# Patient Record
Sex: MALE | Race: BLACK OR AFRICAN AMERICAN | Employment: OTHER | URBAN - METROPOLITAN AREA
[De-identification: names, ages, dates, MRNs, and addresses within clinical notes are randomized per-mention and may not be internally consistent; named-entity substitution may affect disease eponyms.]

---

## 2018-03-08 ENCOUNTER — HOSPITAL ENCOUNTER (EMERGENCY)
Age: 23
Discharge: ANOTHER HOSPITAL | End: 2018-03-08
Attending: EMERGENCY MEDICINE
Payer: OTHER GOVERNMENT

## 2018-03-08 VITALS
HEART RATE: 60 BPM | WEIGHT: 151 LBS | DIASTOLIC BLOOD PRESSURE: 63 MMHG | TEMPERATURE: 98.4 F | RESPIRATION RATE: 16 BRPM | OXYGEN SATURATION: 99 % | BODY MASS INDEX: 22.36 KG/M2 | HEIGHT: 69 IN | SYSTOLIC BLOOD PRESSURE: 131 MMHG

## 2018-03-08 DIAGNOSIS — R45.851 SUICIDAL THOUGHTS: Primary | ICD-10-CM

## 2018-03-08 LAB
ALBUMIN SERPL-MCNC: 4 G/DL (ref 3.4–5)
ALBUMIN/GLOB SERPL: 1.1 {RATIO} (ref 0.8–1.7)
ALP SERPL-CCNC: 81 U/L (ref 45–117)
ALT SERPL-CCNC: 27 U/L (ref 16–61)
AMPHET UR QL SCN: NEGATIVE
ANION GAP SERPL CALC-SCNC: 8 MMOL/L (ref 3–18)
APAP SERPL-MCNC: <2 UG/ML (ref 10–30)
AST SERPL-CCNC: 19 U/L (ref 15–37)
BARBITURATES UR QL SCN: NEGATIVE
BASOPHILS # BLD: 0 K/UL (ref 0–0.06)
BASOPHILS NFR BLD: 0 % (ref 0–2)
BENZODIAZ UR QL: NEGATIVE
BILIRUB SERPL-MCNC: 0.4 MG/DL (ref 0.2–1)
BUN SERPL-MCNC: 12 MG/DL (ref 7–18)
BUN/CREAT SERPL: 10 (ref 12–20)
CALCIUM SERPL-MCNC: 8.8 MG/DL (ref 8.5–10.1)
CANNABINOIDS UR QL SCN: NEGATIVE
CHLORIDE SERPL-SCNC: 105 MMOL/L (ref 100–108)
CO2 SERPL-SCNC: 28 MMOL/L (ref 21–32)
COCAINE UR QL SCN: NEGATIVE
CREAT SERPL-MCNC: 1.18 MG/DL (ref 0.6–1.3)
DIFFERENTIAL METHOD BLD: NORMAL
EOSINOPHIL # BLD: 0 K/UL (ref 0–0.4)
EOSINOPHIL NFR BLD: 1 % (ref 0–5)
ERYTHROCYTE [DISTWIDTH] IN BLOOD BY AUTOMATED COUNT: 13.6 % (ref 11.6–14.5)
ETHANOL SERPL-MCNC: <3 MG/DL (ref 0–3)
GLOBULIN SER CALC-MCNC: 3.5 G/DL (ref 2–4)
GLUCOSE SERPL-MCNC: 103 MG/DL (ref 74–99)
HCT VFR BLD AUTO: 44.8 % (ref 36–48)
HDSCOM,HDSCOM: NORMAL
HGB BLD-MCNC: 14.8 G/DL (ref 13–16)
LYMPHOCYTES # BLD: 2.2 K/UL (ref 0.9–3.6)
LYMPHOCYTES NFR BLD: 39 % (ref 21–52)
MCH RBC QN AUTO: 30.3 PG (ref 24–34)
MCHC RBC AUTO-ENTMCNC: 33 G/DL (ref 31–37)
MCV RBC AUTO: 91.6 FL (ref 74–97)
METHADONE UR QL: NEGATIVE
MONOCYTES # BLD: 0.4 K/UL (ref 0.05–1.2)
MONOCYTES NFR BLD: 6 % (ref 3–10)
NEUTS SEG # BLD: 3 K/UL (ref 1.8–8)
NEUTS SEG NFR BLD: 54 % (ref 40–73)
OPIATES UR QL: NEGATIVE
PCP UR QL: NEGATIVE
PLATELET # BLD AUTO: 201 K/UL (ref 135–420)
PMV BLD AUTO: 9.9 FL (ref 9.2–11.8)
POTASSIUM SERPL-SCNC: 3.9 MMOL/L (ref 3.5–5.5)
PROT SERPL-MCNC: 7.5 G/DL (ref 6.4–8.2)
RBC # BLD AUTO: 4.89 M/UL (ref 4.7–5.5)
SALICYLATES SERPL-MCNC: <2.8 MG/DL (ref 2.8–20)
SODIUM SERPL-SCNC: 141 MMOL/L (ref 136–145)
WBC # BLD AUTO: 5.6 K/UL (ref 4.6–13.2)

## 2018-03-08 PROCEDURE — 80307 DRUG TEST PRSMV CHEM ANLYZR: CPT | Performed by: EMERGENCY MEDICINE

## 2018-03-08 PROCEDURE — 85025 COMPLETE CBC W/AUTO DIFF WBC: CPT | Performed by: EMERGENCY MEDICINE

## 2018-03-08 PROCEDURE — 99283 EMERGENCY DEPT VISIT LOW MDM: CPT

## 2018-03-08 PROCEDURE — 93005 ELECTROCARDIOGRAM TRACING: CPT

## 2018-03-08 PROCEDURE — 80053 COMPREHEN METABOLIC PANEL: CPT | Performed by: EMERGENCY MEDICINE

## 2018-03-08 NOTE — ED NOTES
Pt notified of transfer to [facility].     Telephone report called to Rosana Romero RN at [facility]. Nurse report was given to would only given last name listed above.

## 2018-03-08 NOTE — ED NOTES
Pt notified of need for urine sample. Pt given paper scrubs and asked to change into them. Sitter at bedside.

## 2018-03-08 NOTE — ED PROVIDER NOTES
EMERGENCY DEPARTMENT HISTORY AND PHYSICAL EXAM    Date: 3/8/2018  Patient Name: Alexis Powers    History of Presenting Illness     Chief Complaint   Patient presents with    Suicidal         History Provided By: Patient    Chief Complaint: SI  Duration: 2 Days  Timing:  Intermittent  Location: Psych  Quality: without a plan  Severity: 7 out of 10  Modifying Factors: Grandmother is close to passing away. Associated Symptoms: Pt also has chronic right hip pain    Additional History (Context):   3:32 PM  Alexis Powers is a 25 y.o. male with PMHX of right hip injury who presents to the emergency department C/O intermittent SI without a plan onset 2 days ago. Pt states his grandmother in [facility] is close to passing away and his work is stressful. Pt has field training today. Pt states, \"I just don't want to be here anymore and want to be with my grandmother. \" Pt also has chronic right hip pain. Pt went to psychiatry as the first person he went to. FHx of bipolar (father), depression (father), and suicide attempts (brother with hanging self). Pt lives in [place]. NKDA. Pt is a cigarette smoker and an EtOH user. Pt denies hallucinations, current SI, HI, taking any pills, and any other sxs or complaints. PCP: Adria Dempsey MD        Past History     Past Medical History:  Past Medical History:   Diagnosis Date    Hip injury        Past Surgical History:  Past Surgical History:   Procedure Laterality Date    HX OTHER SURGICAL      surgery on lip       Family History:  No family history on file. Social History:  Social History   Substance Use Topics    Smoking status: Current Every Day Smoker    Smokeless tobacco: Not on file    Alcohol use Yes      Comment: rare       Allergies:  No Known Allergies      Review of Systems   Review of Systems   Gastrointestinal: Negative for abdominal pain and vomiting. Musculoskeletal: Positive for arthralgias (right hip pain chronic).  Negative for neck pain and neck stiffness. Neurological: Negative for seizures and weakness. Psychiatric/Behavioral: Positive for suicidal ideas (but none currently). Negative for hallucinations. (-) HI   All other systems reviewed and are negative. Physical Exam     Vitals:    03/08/18 1526   BP: 131/63   Pulse: 60   Resp: 16   Temp: 98.4 °F (36.9 °C)   SpO2: 99%   Weight: 68.5 kg (151 lb)   Height: 5' 9\" (1.753 m)     Physical Exam   Nursing note and vitals reviewed. Vital signs and nursing notes reviewed    CONSTITUTIONAL: Alert, in no apparent distress; well-developed; well-nourished. HEAD:  Normocephalic, atraumatic  EYES: PERRL; EOM's intact. ENTM: Nose: no rhinorrhea; Throat: no erythema or exudate, mucous membranes moist  Neck:  No JVD, supple without lymphadenopathy  RESP: Chest clear, equal breath sounds. CV: S1 and S2 WNL; No murmurs, gallops or rubs. GI: Normal bowel sounds, abdomen soft and non-tender. No masses or organomegaly. : No costo-vertebral angle tenderness. BACK:  Non-tender  UPPER EXT:  Normal inspection  LOWER EXT: No edema, no calf tenderness. Distal pulses intact. NEURO: CN intact, reflexes 2/4 and sym, strength 5/5 and sym, sensation intact. SKIN: No rashes; Normal for age and stage. PSYCH:  Alert and oriented, normal affect. No active SI.   No active HI      Diagnostic Study Results     Labs -     Recent Results (from the past 12 hour(s))   EKG, 12 LEAD, INITIAL    Collection Time: 03/08/18  3:37 PM   Result Value Ref Range    Ventricular Rate 57 BPM    Atrial Rate 57 BPM    P-R Interval 146 ms    QRS Duration 94 ms    Q-T Interval 374 ms    QTC Calculation (Bezet) 364 ms    Calculated P Axis 50 degrees    Calculated R Axis 77 degrees    Calculated T Axis 46 degrees    Diagnosis       Sinus bradycardia  Otherwise normal ECG  No previous ECGs available     DRUG SCREEN, URINE    Collection Time: 03/08/18  3:42 PM   Result Value Ref Range    BENZODIAZEPINES NEGATIVE  NEG      BARBITURATES NEGATIVE  NEG      THC (TH-CANNABINOL) NEGATIVE  NEG      OPIATES NEGATIVE  NEG      PCP(PHENCYCLIDINE) NEGATIVE  NEG      COCAINE NEGATIVE  NEG      AMPHETAMINES NEGATIVE  NEG      METHADONE NEGATIVE  NEG      HDSCOM (NOTE)    CBC WITH AUTOMATED DIFF    Collection Time: 03/08/18  3:48 PM   Result Value Ref Range    WBC 5.6 4.6 - 13.2 K/uL    RBC 4.89 4.70 - 5.50 M/uL    HGB 14.8 13.0 - 16.0 g/dL    HCT 44.8 36.0 - 48.0 %    MCV 91.6 74.0 - 97.0 FL    MCH 30.3 24.0 - 34.0 PG    MCHC 33.0 31.0 - 37.0 g/dL    RDW 13.6 11.6 - 14.5 %    PLATELET 658 732 - 500 K/uL    MPV 9.9 9.2 - 11.8 FL    NEUTROPHILS 54 40 - 73 %    LYMPHOCYTES 39 21 - 52 %    MONOCYTES 6 3 - 10 %    EOSINOPHILS 1 0 - 5 %    BASOPHILS 0 0 - 2 %    ABS. NEUTROPHILS 3.0 1.8 - 8.0 K/UL    ABS. LYMPHOCYTES 2.2 0.9 - 3.6 K/UL    ABS. MONOCYTES 0.4 0.05 - 1.2 K/UL    ABS. EOSINOPHILS 0.0 0.0 - 0.4 K/UL    ABS. BASOPHILS 0.0 0.0 - 0.06 K/UL    DF AUTOMATED     METABOLIC PANEL, COMPREHENSIVE    Collection Time: 03/08/18  3:48 PM   Result Value Ref Range    Sodium 141 136 - 145 mmol/L    Potassium 3.9 3.5 - 5.5 mmol/L    Chloride 105 100 - 108 mmol/L    CO2 28 21 - 32 mmol/L    Anion gap 8 3.0 - 18 mmol/L    Glucose 103 (H) 74 - 99 mg/dL    BUN 12 7.0 - 18 MG/DL    Creatinine 1.18 0.6 - 1.3 MG/DL    BUN/Creatinine ratio 10 (L) 12 - 20      GFR est AA >60 >60 ml/min/1.73m2    GFR est non-AA >60 >60 ml/min/1.73m2    Calcium 8.8 8.5 - 10.1 MG/DL    Bilirubin, total 0.4 0.2 - 1.0 MG/DL    ALT (SGPT) 27 16 - 61 U/L    AST (SGOT) 19 15 - 37 U/L    Alk.  phosphatase 81 45 - 117 U/L    Protein, total 7.5 6.4 - 8.2 g/dL    Albumin 4.0 3.4 - 5.0 g/dL    Globulin 3.5 2.0 - 4.0 g/dL    A-G Ratio 1.1 0.8 - 1.7     ETHYL ALCOHOL    Collection Time: 03/08/18  3:48 PM   Result Value Ref Range    ALCOHOL(ETHYL),SERUM <3 0 - 3 MG/DL   ACETAMINOPHEN    Collection Time: 03/08/18  3:48 PM   Result Value Ref Range    Acetaminophen level <2 (L) 10.0 - 24.8 ug/mL   SALICYLATE Collection Time: 03/08/18  3:48 PM   Result Value Ref Range    Salicylate level <0.6 (L) 2.8 - 20 MG/DL       Radiologic Studies -   No orders to display     CT Results  (Last 48 hours)    None        CXR Results  (Last 48 hours)    None          Medications given in the ED-  Medications - No data to display      Medical Decision Making   I am the first provider for this patient. I reviewed the vital signs, available nursing notes, past medical history, past surgical history, family history and social history. Vital Signs-Reviewed the patient's vital signs. Pulse Oximetry Analysis - 99% on room air. EKG interpretation: (Preliminary)  3:37 PM   Sinus bradycardia at 57 bpm. ND interval at 146 ms. QRS duration at 94 ms. Negative STEMI. EKG read by Radha Rodriguez MD at 3:37 PM     Records Reviewed: Nursing Notes    Provider Notes (Medical Decision Making):    Procedures:  Procedures    ED Course:   3:36 PM Initial assessment performed. The patients presenting problems have been discussed, and they are in agreement with the care plan formulated and outlined with them. I have encouraged them to ask questions as they arise throughout their visit. 4:32 PM Discussed patient's history, exam, and available diagnostics results with Asif Nava, psychiatry, who agree with calling us back when there is a bed available to accept pt for transfer at [facility] in [place]. Diagnosis and Disposition       TRANSFER PROGRESS NOTE:    5:25 PM  Discussed impending transfer with Patient and/or family. Pt and/or family instructed that Pt would be transferred to [facility].  Discussed reasoning for transfer and future treatment plan. Family and Pt understands and agrees with care plan.   Written by Alicia Mai ED Scribe, as dictated by Radha Rodriguez MD.    Labs Reviewed   METABOLIC PANEL, COMPREHENSIVE - Abnormal; Notable for the following:        Result Value    Glucose 103 (*)     BUN/Creatinine ratio 10 (*)     All other components within normal limits   ACETAMINOPHEN - Abnormal; Notable for the following:     Acetaminophen level <2 (*)     All other components within normal limits   SALICYLATE - Abnormal; Notable for the following:     Salicylate level <0.5 (*)     All other components within normal limits   CBC WITH AUTOMATED DIFF   DRUG SCREEN, URINE   ETHYL ALCOHOL       Recent Results (from the past 12 hour(s))   EKG, 12 LEAD, INITIAL    Collection Time: 03/08/18  3:37 PM   Result Value Ref Range    Ventricular Rate 57 BPM    Atrial Rate 57 BPM    P-R Interval 146 ms    QRS Duration 94 ms    Q-T Interval 374 ms    QTC Calculation (Bezet) 364 ms    Calculated P Axis 50 degrees    Calculated R Axis 77 degrees    Calculated T Axis 46 degrees    Diagnosis       Sinus bradycardia  Otherwise normal ECG  No previous ECGs available     DRUG SCREEN, URINE    Collection Time: 03/08/18  3:42 PM   Result Value Ref Range    BENZODIAZEPINES NEGATIVE  NEG      BARBITURATES NEGATIVE  NEG      THC (TH-CANNABINOL) NEGATIVE  NEG      OPIATES NEGATIVE  NEG      PCP(PHENCYCLIDINE) NEGATIVE  NEG      COCAINE NEGATIVE  NEG      AMPHETAMINES NEGATIVE  NEG      METHADONE NEGATIVE  NEG      HDSCOM (NOTE)    CBC WITH AUTOMATED DIFF    Collection Time: 03/08/18  3:48 PM   Result Value Ref Range    WBC 5.6 4.6 - 13.2 K/uL    RBC 4.89 4.70 - 5.50 M/uL    HGB 14.8 13.0 - 16.0 g/dL    HCT 44.8 36.0 - 48.0 %    MCV 91.6 74.0 - 97.0 FL    MCH 30.3 24.0 - 34.0 PG    MCHC 33.0 31.0 - 37.0 g/dL    RDW 13.6 11.6 - 14.5 %    PLATELET 693 993 - 527 K/uL    MPV 9.9 9.2 - 11.8 FL    NEUTROPHILS 54 40 - 73 %    LYMPHOCYTES 39 21 - 52 %    MONOCYTES 6 3 - 10 %    EOSINOPHILS 1 0 - 5 %    BASOPHILS 0 0 - 2 %    ABS. NEUTROPHILS 3.0 1.8 - 8.0 K/UL    ABS. LYMPHOCYTES 2.2 0.9 - 3.6 K/UL    ABS. MONOCYTES 0.4 0.05 - 1.2 K/UL    ABS. EOSINOPHILS 0.0 0.0 - 0.4 K/UL    ABS.  BASOPHILS 0.0 0.0 - 0.06 K/UL    DF AUTOMATED     METABOLIC PANEL, COMPREHENSIVE    Collection Time: 03/08/18  3:48 PM   Result Value Ref Range    Sodium 141 136 - 145 mmol/L    Potassium 3.9 3.5 - 5.5 mmol/L    Chloride 105 100 - 108 mmol/L    CO2 28 21 - 32 mmol/L    Anion gap 8 3.0 - 18 mmol/L    Glucose 103 (H) 74 - 99 mg/dL    BUN 12 7.0 - 18 MG/DL    Creatinine 1.18 0.6 - 1.3 MG/DL    BUN/Creatinine ratio 10 (L) 12 - 20      GFR est AA >60 >60 ml/min/1.73m2    GFR est non-AA >60 >60 ml/min/1.73m2    Calcium 8.8 8.5 - 10.1 MG/DL    Bilirubin, total 0.4 0.2 - 1.0 MG/DL    ALT (SGPT) 27 16 - 61 U/L    AST (SGOT) 19 15 - 37 U/L    Alk. phosphatase 81 45 - 117 U/L    Protein, total 7.5 6.4 - 8.2 g/dL    Albumin 4.0 3.4 - 5.0 g/dL    Globulin 3.5 2.0 - 4.0 g/dL    A-G Ratio 1.1 0.8 - 1.7     ETHYL ALCOHOL    Collection Time: 03/08/18  3:48 PM   Result Value Ref Range    ALCOHOL(ETHYL),SERUM <3 0 - 3 MG/DL   ACETAMINOPHEN    Collection Time: 03/08/18  3:48 PM   Result Value Ref Range    Acetaminophen level <2 (L) 10.0 - 36.1 ug/mL   SALICYLATE    Collection Time: 03/08/18  3:48 PM   Result Value Ref Range    Salicylate level <6.5 (L) 2.8 - 20 MG/DL       CLINICAL IMPRESSION    1. Suicidal thoughts          PLAN:  1. TRANSFER  Labs reassuring. Well appearing. With concrete plan given to Sara 2 Worker will transfer to [facility] for voluntary admission. BLS transport. No distress. Patient comfortable with plan  _______________________________    Attestations: This note is prepared by Prashant Segura, acting as Scribe for Mandy Roque MD.    Mandy Roque MD:  The scribe's documentation has been prepared under my direction and personally reviewed by me in its entirety.   I confirm that the note above accurately reflects all work, treatment, procedures, and medical decision making performed by me.  _______________________________

## 2018-03-08 NOTE — ED NOTES
[facility] at bedside to  patient. Report given to [facility]. EMTALA filled out completely and given to [facility] in addition to copy of patient chart. Last name only listed in EMTALA for RN that received report. Pt loaded onto [facility] stretcher and taken out of department.

## 2018-03-08 NOTE — ED TRIAGE NOTES
C/o suicidal thoughts for 2 days. Denies having a plan, states his grandmother is close to passing away and his work his stressful. Sepsis Screening completed    (  )Patient meets SIRS criteria. (x  )Patient does not meet SIRS criteria.       SIRS Criteria is achieved when two or more of the following are present   Temperature < 96.8°F (36°C) or > 100.9°F (38.3°C)   Heart Rate > 90 beats per minute   Respiratory Rate > 20 breaths per minute   WBC count > 12,000 or <4,000 or > 10% bands

## 2018-03-11 LAB
ATRIAL RATE: 57 BPM
CALCULATED P AXIS, ECG09: 50 DEGREES
CALCULATED R AXIS, ECG10: 77 DEGREES
CALCULATED T AXIS, ECG11: 46 DEGREES
DIAGNOSIS, 93000: NORMAL
P-R INTERVAL, ECG05: 146 MS
Q-T INTERVAL, ECG07: 374 MS
QRS DURATION, ECG06: 94 MS
QTC CALCULATION (BEZET), ECG08: 364 MS
VENTRICULAR RATE, ECG03: 57 BPM